# Patient Record
Sex: MALE | Race: WHITE | NOT HISPANIC OR LATINO | Employment: OTHER | URBAN - METROPOLITAN AREA
[De-identification: names, ages, dates, MRNs, and addresses within clinical notes are randomized per-mention and may not be internally consistent; named-entity substitution may affect disease eponyms.]

---

## 2021-05-04 ENCOUNTER — NEW PATIENT COMPREHENSIVE (OUTPATIENT)
Dept: URBAN - METROPOLITAN AREA CLINIC 35 | Facility: CLINIC | Age: 71
End: 2021-05-04

## 2021-05-04 DIAGNOSIS — H52.203: ICD-10-CM

## 2021-05-04 DIAGNOSIS — H25.13: ICD-10-CM

## 2021-05-04 DIAGNOSIS — H52.03: ICD-10-CM

## 2021-05-04 DIAGNOSIS — H43.811: ICD-10-CM

## 2021-05-04 PROCEDURE — 92004 COMPRE OPH EXAM NEW PT 1/>: CPT

## 2021-05-04 PROCEDURE — 92015 DETERMINE REFRACTIVE STATE: CPT

## 2021-05-04 ASSESSMENT — VISUAL ACUITY
OD_SC: J10
OS_CC: J2
OD_CC: J1
OD_CC: 20/25-2
OD_SC: 20/25-2
OS_CC: 20/25-2
OS_SC: 20/70

## 2021-05-04 ASSESSMENT — KERATOMETRY
OD_K1POWER_DIOPTERS: 44.50
OD_K2POWER_DIOPTERS: 44.00
OS_K1POWER_DIOPTERS: 44.25
OS_K2POWER_DIOPTERS: 43.75

## 2021-05-04 ASSESSMENT — TONOMETRY
OD_IOP_MMHG: 11
OS_IOP_MMHG: 11

## 2022-11-29 NOTE — PATIENT DISCUSSION
** 12/15/2022 PT ELECTS BASIC YAZ GOAL FOR HIS CATARACT SURGERY.  UNDERSTANDS THE NEED FOR GLASSES AT ALL RANGES FOR PERSONAL BEST VISION FIOR **.

## 2023-01-17 NOTE — PATIENT DISCUSSION
Glasses Rx given. Have Your Skin Lesions Been Treated?: not been treated Is This A New Presentation, Or A Follow-Up?: Skin Lesions How Severe Is Your Skin Lesion?: mild

## 2024-03-28 NOTE — PATIENT DISCUSSION
C-collar removed by MD   Patient education on consistently taking the latanoprost every evening before bedtime in both eyes.

## 2024-09-11 ENCOUNTER — NEW PATIENT (OUTPATIENT)
Dept: URBAN - METROPOLITAN AREA CLINIC 35 | Facility: CLINIC | Age: 74
End: 2024-09-11

## 2024-09-11 DIAGNOSIS — H43.811: ICD-10-CM

## 2024-09-11 DIAGNOSIS — H25.813: ICD-10-CM

## 2024-09-11 DIAGNOSIS — H35.363: ICD-10-CM

## 2024-09-11 DIAGNOSIS — H52.7: ICD-10-CM

## 2024-09-11 PROCEDURE — 92004 COMPRE OPH EXAM NEW PT 1/>: CPT

## 2024-09-11 PROCEDURE — 92015 DETERMINE REFRACTIVE STATE: CPT
